# Patient Record
Sex: MALE | Race: WHITE | NOT HISPANIC OR LATINO | Employment: OTHER | URBAN - METROPOLITAN AREA
[De-identification: names, ages, dates, MRNs, and addresses within clinical notes are randomized per-mention and may not be internally consistent; named-entity substitution may affect disease eponyms.]

---

## 2017-01-04 DIAGNOSIS — N18.3 CKD (CHRONIC KIDNEY DISEASE), STAGE 3 (MODERATE): ICD-10-CM

## 2017-01-04 DIAGNOSIS — I10 ESSENTIAL HYPERTENSION: ICD-10-CM

## 2017-01-04 RX ORDER — ERGOCALCIFEROL 1.25 MG/1
50000 CAPSULE ORAL
Qty: 12 CAPSULE | Refills: 2 | Status: SHIPPED | OUTPATIENT
Start: 2017-01-04 | End: 2017-07-26 | Stop reason: SDUPTHER

## 2017-01-04 RX ORDER — LISINOPRIL 5 MG/1
5 TABLET ORAL DAILY
Qty: 30 TABLET | Refills: 4 | Status: SHIPPED | OUTPATIENT
Start: 2017-01-04 | End: 2017-06-05 | Stop reason: SDUPTHER

## 2017-01-11 ENCOUNTER — LAB VISIT (OUTPATIENT)
Dept: LAB | Facility: HOSPITAL | Age: 72
End: 2017-01-11
Attending: INTERNAL MEDICINE
Payer: MEDICARE

## 2017-01-11 DIAGNOSIS — N18.30 CKD (CHRONIC KIDNEY DISEASE), STAGE III: ICD-10-CM

## 2017-01-11 DIAGNOSIS — E21.3 HYPERPARATHYROIDISM: ICD-10-CM

## 2017-01-11 DIAGNOSIS — I10 ESSENTIAL HYPERTENSION: ICD-10-CM

## 2017-01-11 LAB
ALBUMIN SERPL BCP-MCNC: 3.6 G/DL
ANION GAP SERPL CALC-SCNC: 9 MMOL/L
BACTERIA #/AREA URNS HPF: NORMAL /HPF
BASOPHILS # BLD AUTO: 0.04 K/UL
BASOPHILS NFR BLD: 0.5 %
BILIRUB UR QL STRIP: NEGATIVE
BUN SERPL-MCNC: 23 MG/DL
CALCIUM SERPL-MCNC: 9.7 MG/DL
CHLORIDE SERPL-SCNC: 109 MMOL/L
CLARITY UR: CLEAR
CO2 SERPL-SCNC: 27 MMOL/L
COLOR UR: YELLOW
CREAT SERPL-MCNC: 1.8 MG/DL
CREAT UR-MCNC: 94.3 MG/DL
DIFFERENTIAL METHOD: ABNORMAL
EOSINOPHIL # BLD AUTO: 0.4 K/UL
EOSINOPHIL NFR BLD: 4.7 %
ERYTHROCYTE [DISTWIDTH] IN BLOOD BY AUTOMATED COUNT: 16 %
EST. GFR  (AFRICAN AMERICAN): 43 ML/MIN/1.73 M^2
EST. GFR  (NON AFRICAN AMERICAN): 37 ML/MIN/1.73 M^2
GLUCOSE SERPL-MCNC: 111 MG/DL
GLUCOSE UR QL STRIP: NEGATIVE
HCT VFR BLD AUTO: 47.4 %
HGB BLD-MCNC: 15.6 G/DL
HGB UR QL STRIP: ABNORMAL
HYALINE CASTS #/AREA URNS LPF: 0 /LPF
KETONES UR QL STRIP: NEGATIVE
LEUKOCYTE ESTERASE UR QL STRIP: NEGATIVE
LYMPHOCYTES # BLD AUTO: 1.7 K/UL
LYMPHOCYTES NFR BLD: 20.6 %
MCH RBC QN AUTO: 30.1 PG
MCHC RBC AUTO-ENTMCNC: 32.9 %
MCV RBC AUTO: 92 FL
MICROSCOPIC COMMENT: NORMAL
MONOCYTES # BLD AUTO: 0.8 K/UL
MONOCYTES NFR BLD: 10 %
NEUTROPHILS # BLD AUTO: 5.3 K/UL
NEUTROPHILS NFR BLD: 64.2 %
NITRITE UR QL STRIP: NEGATIVE
PH UR STRIP: 7 [PH] (ref 5–8)
PHOSPHATE SERPL-MCNC: 2.6 MG/DL
PLATELET # BLD AUTO: 197 K/UL
PMV BLD AUTO: 11.4 FL
POTASSIUM SERPL-SCNC: 4.3 MMOL/L
PROT UR QL STRIP: ABNORMAL
PROT UR-MCNC: 159 MG/DL
PROT/CREAT RATIO, UR: 1.69
RBC # BLD AUTO: 5.18 M/UL
RBC #/AREA URNS HPF: 3 /HPF (ref 0–4)
SODIUM SERPL-SCNC: 145 MMOL/L
SP GR UR STRIP: 1.02 (ref 1–1.03)
URN SPEC COLLECT METH UR: ABNORMAL
UROBILINOGEN UR STRIP-ACNC: NEGATIVE EU/DL
WBC # BLD AUTO: 8.31 K/UL
WBC #/AREA URNS HPF: 3 /HPF (ref 0–5)

## 2017-01-11 PROCEDURE — 83970 ASSAY OF PARATHORMONE: CPT

## 2017-01-11 PROCEDURE — 85025 COMPLETE CBC W/AUTO DIFF WBC: CPT

## 2017-01-11 PROCEDURE — 80069 RENAL FUNCTION PANEL: CPT

## 2017-01-11 PROCEDURE — 36415 COLL VENOUS BLD VENIPUNCTURE: CPT

## 2017-01-11 PROCEDURE — 82570 ASSAY OF URINE CREATININE: CPT

## 2017-01-11 PROCEDURE — 81000 URINALYSIS NONAUTO W/SCOPE: CPT

## 2017-01-12 LAB — PTH-INTACT SERPL-MCNC: 73 PG/ML

## 2017-01-18 ENCOUNTER — OFFICE VISIT (OUTPATIENT)
Dept: NEPHROLOGY | Facility: CLINIC | Age: 72
End: 2017-01-18
Payer: MEDICARE

## 2017-01-18 VITALS
RESPIRATION RATE: 16 BRPM | HEIGHT: 72 IN | SYSTOLIC BLOOD PRESSURE: 112 MMHG | BODY MASS INDEX: 28.64 KG/M2 | DIASTOLIC BLOOD PRESSURE: 82 MMHG | WEIGHT: 211.44 LBS | HEART RATE: 80 BPM

## 2017-01-18 DIAGNOSIS — N18.30 CKD (CHRONIC KIDNEY DISEASE), STAGE III: ICD-10-CM

## 2017-01-18 DIAGNOSIS — N40.1 BENIGN NON-NODULAR PROSTATIC HYPERPLASIA WITH LOWER URINARY TRACT SYMPTOMS: ICD-10-CM

## 2017-01-18 DIAGNOSIS — I63.9 CEREBROVASCULAR ACCIDENT (CVA), UNSPECIFIED MECHANISM: ICD-10-CM

## 2017-01-18 DIAGNOSIS — I10 ESSENTIAL HYPERTENSION: ICD-10-CM

## 2017-01-18 DIAGNOSIS — C43.9 MALIGNANT MELANOMA, UNSPECIFIED SITE: ICD-10-CM

## 2017-01-18 DIAGNOSIS — Z90.5 ABSENT KIDNEY, ACQUIRED: Chronic | ICD-10-CM

## 2017-01-18 DIAGNOSIS — E21.3 HYPERPARATHYROIDISM: Primary | ICD-10-CM

## 2017-01-18 PROCEDURE — 3074F SYST BP LT 130 MM HG: CPT | Mod: S$GLB,,, | Performed by: INTERNAL MEDICINE

## 2017-01-18 PROCEDURE — 1160F RVW MEDS BY RX/DR IN RCRD: CPT | Mod: S$GLB,,, | Performed by: INTERNAL MEDICINE

## 2017-01-18 PROCEDURE — 1157F ADVNC CARE PLAN IN RCRD: CPT | Mod: S$GLB,,, | Performed by: INTERNAL MEDICINE

## 2017-01-18 PROCEDURE — 1159F MED LIST DOCD IN RCRD: CPT | Mod: S$GLB,,, | Performed by: INTERNAL MEDICINE

## 2017-01-18 PROCEDURE — 1126F AMNT PAIN NOTED NONE PRSNT: CPT | Mod: S$GLB,,, | Performed by: INTERNAL MEDICINE

## 2017-01-18 PROCEDURE — 3079F DIAST BP 80-89 MM HG: CPT | Mod: S$GLB,,, | Performed by: INTERNAL MEDICINE

## 2017-01-18 PROCEDURE — 99999 PR PBB SHADOW E&M-EST. PATIENT-LVL III: CPT | Mod: PBBFAC,,, | Performed by: INTERNAL MEDICINE

## 2017-01-18 PROCEDURE — 99213 OFFICE O/P EST LOW 20 MIN: CPT | Mod: S$GLB,,, | Performed by: INTERNAL MEDICINE

## 2017-01-18 NOTE — MR AVS SNAPSHOT
[facility]  [address] [facility] [address]  Phone: [phone]                  Federico Knutson   2017 2:00 PM   Office Visit    Description:  Male : 1945   Provider:  Nereida Lei MD   Department:  [facility]           Diagnoses this Visit        Comments    Hyperparathyroidism    -  Primary     Malignant melanoma, unspecified site         CKD (chronic kidney disease), stage III         Absent kidney, acquired         Benign non-nodular prostatic hyperplasia with lower urinary tract symptoms         Cerebrovascular accident (CVA), unspecified mechanism         Essential hypertension                To Do List           Goals (5 Years of Data)     None      Follow-Up and Disposition     Return in about 4 months (around 2017).    Follow-up and Disposition History      [facility]     [facility] On Call Nurse Care Line -  Assistance  Registered nurses in the [facility] On Call Center provide clinical advisement, health education, appointment booking, and other advisory services.  Call for this free service at [phone].             Medications           Message regarding Medications     Verify the changes and/or additions to your medication regime listed below are the same as discussed with your clinician today.  If any of these changes or additions are incorrect, please notify your healthcare provider.        STOP taking these medications     pantoprazole (PROTONIX) 40 MG tablet Take 40 mg by mouth once daily.            Verify that the below list of medications is an accurate representation of the medications you are currently taking.  If none reported, the list may be blank. If incorrect, please contact your healthcare provider. Carry this list with you in case of emergency.           Current Medications     aspirin 325 MG tablet Take 81 mg by mouth once daily.     ergocalciferol (ERGOCALCIFEROL) 50,000 unit Cap Take 1 capsule  (50,000 Units total) by mouth every 14 (fourteen) days.    lisinopril (PRINIVIL,ZESTRIL) 5 MG tablet Take 1 tablet (5 mg total) by mouth once daily.    rosuvastatin (CRESTOR) 40 MG Tab Take 40 mg by mouth every evening.    tamsulosin (FLOMAX) 0.4 mg Cp24 Take 0.4 mg by mouth once daily.           Clinical Reference Information           Vital Signs - Last Recorded  Most recent update: 1/18/2017  2:03 PM by Makayla Sahu MA    BP Pulse Resp Ht Wt BMI    112/82 (BP Location: Left arm, Patient Position: Sitting, BP Method: Manual) 80 16 6' (1.829 m) 95.9 kg (211 lb 6.7 oz) 28.67 kg/m2      Blood Pressure          Most Recent Value    BP  112/82      Allergies as of 1/18/2017     No Known Allergies      Immunizations Administered on Date of Encounter - 1/18/2017     None      Orders Placed During Today's Visit     Future Labs/Procedures Expected by Expires    US Retroperitoneal Complete  1/18/2017 1/18/2018    CBC auto differential  4/18/2017 1/18/2018    Protein / creatinine ratio, urine  5/18/2017 1/18/2018    PTH, intact  5/18/2017 1/18/2018    Renal function panel  5/18/2017 1/18/2018    Urinalysis  5/18/2017 1/18/2018    Vitamin D  5/18/2017 1/18/2018      Instructions      1. Labs: in 3 months and renal Us prior to next visit  2.  Medications:      5. BP:  Take BP and pulse  twice daily for one week, record              Bring results  to next visit.              Goal :   <140/90    6. Diet:         Sodium: < 2000 milligrams daily including all food and drink      (one teaspoon of table salt has 2300 milligrams of sodium)       Please stop smoking and speak to your PCP about cessation therapy and hip pain.      7. Please avoid or minimize all NSAIDS (ibuprofen, motrin, aleve, indocin, naprosyn) to minimize the risk to your kidneys    8. Return to clinic: 4 months       Smoking Cessation     If you would like to quit smoking:   You may be eligible for free services if you are a [place] resident and started  smoking cigarettes before September 1, 1988.  Call the [employer] ([employer]) toll free at (199) 250-4062 or (970) 143-8033.   Call 3-302-QUIT-NOW if you do not meet the above criteria.

## 2017-01-18 NOTE — PATIENT INSTRUCTIONS
1. Labs: in 3 months and renal Us prior to next visit  2.  Medications:      5. BP:  Take BP and pulse  twice daily for one week, record              Bring results  to next visit.              Goal :   <140/90    6. Diet:         Sodium: < 2000 milligrams daily including all food and drink      (one teaspoon of table salt has 2300 milligrams of sodium)       Please stop smoking and speak to your PCP about cessation therapy and hip pain.      7. Please avoid or minimize all NSAIDS (ibuprofen, motrin, aleve, indocin, naprosyn) to minimize the risk to your kidneys    8. Return to clinic: 4 months

## 2017-01-18 NOTE — PROGRESS NOTES
Subjective:       Patient ID: Federico Knutson is a 71 y.o. White male who presents for new a follow up of CKD  Interval hx:  Patient feels well, no LUTS, hematuria,, dysuria, SOB, CP, peripheral edema, He is not using protonix, he has used some motrin fro hip pain but usually uses tylenol. He is still smoking.serum crt stable at 1.8 mg/dl, electrolytes stable.  He does not take his bp at home.      71 yo Gentleman with a hx of Melanoma of the forehead excised (lymph nodes) CVA, The patient has a history of obstructive uropathy and is seen by Dr. Ray. He underwent dye laser prostatectomy 6/9/15. Postvoid residual urine prior to prostatectomy was 2000 cc. Postvoid residual urine in June ago was 100 cc. Patient has compromised renal function at present. 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 Renal ultrasound was ago showed mild right hydronephrosis and subsequent retrograde pyelogram revealed no ureteral obstruction. The patient has no family history of kidney disease. The patient does not freuqently use NSAIDS or herbal supplements. He had a GI bleed last week and was found to have an ulcer. He also still has episodes of macroscopic hematuria after his prostatectomy. Also recently treated for a urinary tract infection. He still smokes a pack a day. He drinks a gallon of milk a day. Not on a low salt diet.   He has stable kidney fuction for the last year but still ongoing proteinuria.He complains about left hip pain and back pain which has been getting worse.       HPI  Review of Systems   Constitutional: Negative.    HENT: Negative.    Eyes: Negative.    Respiratory: Positive for cough.    Cardiovascular: Negative.    Gastrointestinal: Negative.  Negative for diarrhea, nausea and vomiting.   Genitourinary: Negative for decreased urine volume, difficulty urinating, dysuria, hematuria and urgency.   Musculoskeletal: Negative.    Skin: Negative.     Neurological: Negative.    Psychiatric/Behavioral: Negative.        Objective:     Blood pressure 112/82, pulse 80, resp. rate 16, height 6' (1.829 m), weight 95.9 kg (211 lb 6.7 oz).      Physical Exam   Constitutional: He is oriented to person, place, and time. He appears well-developed and well-nourished.   HENT:   Head: Normocephalic and atraumatic.   Right Ear: External ear normal.   Left Ear: External ear normal.   Nose: Nose normal.   Mouth/Throat: Oropharynx is clear and moist.   Eyes: Conjunctivae and EOM are normal. Pupils are equal, round, and reactive to light.   Neck: Normal range of motion. Neck supple. No JVD present.   Cardiovascular: Normal rate, regular rhythm, normal heart sounds and intact distal pulses.    No murmur heard.  Pulmonary/Chest: Effort normal and breath sounds normal. No stridor. No respiratory distress. He has no rales. He exhibits no tenderness.   Abdominal: Soft. Bowel sounds are normal. He exhibits no distension and no mass. There is no tenderness. There is no rebound and no guarding.   Musculoskeletal: Normal range of motion.   Lymphadenopathy:     He has no cervical adenopathy.   Neurological: He is alert and oriented to person, place, and time. He has normal reflexes. No cranial nerve deficit. Coordination normal.   Skin: Skin is warm and dry.   2 scars on right forehead, 2 cm lesion on his back.   Psychiatric: He has a normal mood and affect. His behavior is normal. Judgment and thought content normal.   Nursing note and vitals reviewed.      Assessment:       1. Hyperparathyroidism    2. Malignant melanoma, unspecified site    3. CKD (chronic kidney disease), stage III    4. Absent kidney, acquired    5. Benign non-nodular prostatic hyperplasia with lower urinary tract symptoms    6. Cerebrovascular accident (CVA), unspecified mechanism    7. Essential hypertension        Plan:       1. CKD3: stable renal function for the last 6 month - long standing low proteinuria:  multifactorial: solitary kidney (secodary to stone disease), multiple cysts in his remaining right kidney, post obstruction, likely has vascular disease.   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 Also the smoking could cause nodular sclerosis.  - already on a statin  - No discrete monoclonal peaks identified.  He is not compliant with any kind of diet. I told him that his kidney function will decline slowly.  - at this time because of a single kidney and the patient does not want a biopsy    - started low dose ACE (would like to increase it but the patient was mildly hyperkalemic).     -acquired cysts repeat US  Elevated PSA and followed by Dr. Ray Urology.     Lab Results   Component Value Date    CREATININE 1.8 (H) 01/11/2017     ·   Acid-Base:   Lab Results   Component Value Date     01/11/2017    K 4.3 01/11/2017    CO2 27 01/11/2017     2. HTN: Blood pressures well controlled    3. Renal osteodystrophy: will re check PTH   Lab Results   Component Value Date    PTH 73.0 01/11/2017    CALCIUM 9.7 01/11/2017    PHOS 2.6 (L) 01/11/2017       4. Anemia: not anemic, H/H relatively elevated considering renal disease - likely from smoking.  Lab Results   Component Value Date    HGB 15.6 01/11/2017        5. DM:  Last HbA1C   Lab Results   Component Value Date    HGBA1C 6.1 04/03/2013       6. Lipid management: on a statin.  Lab Results   Component Value Date    LDLCALC 56.0 (L) 04/03/2013     7. Elevated PSA:followed by Dr. Ray    8. The patient is following up with Dermatology and was explained that it is very important.     9. He was educated again to quit smoking!!    Follow up in 4 month with labs

## 2017-05-04 ENCOUNTER — HOSPITAL ENCOUNTER (OUTPATIENT)
Dept: RADIOLOGY | Facility: HOSPITAL | Age: 72
Discharge: HOME | End: 2017-05-04
Attending: INTERNAL MEDICINE
Payer: MEDICARE

## 2017-05-04 DIAGNOSIS — Z90.5 ABSENT KIDNEY, ACQUIRED: Chronic | ICD-10-CM

## 2017-05-04 DIAGNOSIS — N40.1 BENIGN NON-NODULAR PROSTATIC HYPERPLASIA WITH LOWER URINARY TRACT SYMPTOMS: ICD-10-CM

## 2017-05-04 DIAGNOSIS — I63.9 CEREBROVASCULAR ACCIDENT (CVA), UNSPECIFIED MECHANISM: ICD-10-CM

## 2017-05-04 DIAGNOSIS — E21.3 HYPERPARATHYROIDISM: ICD-10-CM

## 2017-05-04 DIAGNOSIS — N18.30 CKD (CHRONIC KIDNEY DISEASE), STAGE III: ICD-10-CM

## 2017-05-04 DIAGNOSIS — I10 ESSENTIAL HYPERTENSION: ICD-10-CM

## 2017-05-04 DIAGNOSIS — C43.9 MALIGNANT MELANOMA, UNSPECIFIED SITE: ICD-10-CM

## 2017-05-04 PROCEDURE — 76770 US EXAM ABDO BACK WALL COMP: CPT | Mod: 26,,, | Performed by: RADIOLOGY

## 2017-05-04 PROCEDURE — 76770 US EXAM ABDO BACK WALL COMP: CPT | Mod: TC

## 2017-05-17 ENCOUNTER — TELEPHONE (OUTPATIENT)
Dept: NEPHROLOGY | Facility: CLINIC | Age: 72
End: 2017-05-17

## 2017-05-17 NOTE — TELEPHONE ENCOUNTER
Renal US shows that kidtracee has the same cysts and has aged, as expected, but overall stable

## 2017-06-05 DIAGNOSIS — I10 ESSENTIAL HYPERTENSION: ICD-10-CM

## 2017-06-05 RX ORDER — LISINOPRIL 5 MG/1
5 TABLET ORAL DAILY
Qty: 30 TABLET | Refills: 4 | Status: SHIPPED | OUTPATIENT
Start: 2017-06-05 | End: 2017-11-10 | Stop reason: SDUPTHER

## 2017-07-26 ENCOUNTER — OFFICE VISIT (OUTPATIENT)
Dept: NEPHROLOGY | Facility: CLINIC | Age: 72
End: 2017-07-26
Payer: MEDICARE

## 2017-07-26 VITALS
HEIGHT: 72 IN | HEART RATE: 80 BPM | BODY MASS INDEX: 28.54 KG/M2 | DIASTOLIC BLOOD PRESSURE: 84 MMHG | WEIGHT: 210.75 LBS | RESPIRATION RATE: 18 BRPM | SYSTOLIC BLOOD PRESSURE: 118 MMHG

## 2017-07-26 DIAGNOSIS — N18.30 CKD (CHRONIC KIDNEY DISEASE), STAGE III: Primary | ICD-10-CM

## 2017-07-26 DIAGNOSIS — I10 ESSENTIAL HYPERTENSION: ICD-10-CM

## 2017-07-26 DIAGNOSIS — R80.0 ISOLATED PROTEINURIA WITHOUT SPECIFIC MORPHOLOGIC LESION: ICD-10-CM

## 2017-07-26 PROBLEM — R80.9 PROTEINURIA: Status: ACTIVE | Noted: 2017-07-26

## 2017-07-26 PROCEDURE — 1125F AMNT PAIN NOTED PAIN PRSNT: CPT | Mod: S$GLB,,, | Performed by: INTERNAL MEDICINE

## 2017-07-26 PROCEDURE — 1159F MED LIST DOCD IN RCRD: CPT | Mod: S$GLB,,, | Performed by: INTERNAL MEDICINE

## 2017-07-26 PROCEDURE — 99999 PR PBB SHADOW E&M-EST. PATIENT-LVL III: CPT | Mod: PBBFAC,,, | Performed by: INTERNAL MEDICINE

## 2017-07-26 PROCEDURE — 99213 OFFICE O/P EST LOW 20 MIN: CPT | Mod: S$GLB,,, | Performed by: INTERNAL MEDICINE

## 2017-07-26 RX ORDER — ASPIRIN 81 MG/1
81 TABLET ORAL DAILY
COMMUNITY

## 2017-07-26 RX ORDER — ACETAMINOPHEN 500 MG
1000 TABLET ORAL DAILY PRN
COMMUNITY

## 2017-07-26 RX ORDER — FLUOROURACIL 50 MG/G
CREAM TOPICAL 2 TIMES DAILY
COMMUNITY
End: 2018-01-24

## 2017-07-26 RX ORDER — ERGOCALCIFEROL 1.25 MG/1
50000 CAPSULE ORAL
Qty: 12 CAPSULE | Refills: 2 | Status: SHIPPED | OUTPATIENT
Start: 2017-07-26

## 2017-07-26 NOTE — Clinical Note
I also ordered a 24-hour urine protein electrophoresis and urine for electrolytes electrophoresis.  He can jug up when he goes for his labs in about a month.

## 2017-07-26 NOTE — PATIENT INSTRUCTIONS
Labs renal function panel, cbc and UPRCT every 3 months    24 hr upep/saturnino    Make appointment with urologist to follow PSA      Stop smoking     [place] [address] u once a month

## 2017-07-26 NOTE — Clinical Note
I put the patient in for standing labs every 3 months he should start in about another month as his last labs were in a period

## 2017-07-26 NOTE — PROGRESS NOTES
Subjective:       Patient ID: Federico Knutson is a 72 y.o. White male who presents for new a follow up of CKD  Interval hx:  Patient feels well, no LUTS, hematuria,, dysuria, SOB, CP, peripheral edema, He is not using protonix, he has used some motrin fro hip pain but usually uses tylenol. He is still smoking.serum crt stable at 1.8 mg/dl, electrolytes stable.  He does not take his bp at home.      71 yo Gentleman with a hx of Melanoma of the forehead excised (lymph nodes) CVA, The patient has a history of obstructive uropathy and is seen by Dr. Ray. He underwent dye laser prostatectomy 6/9/15. Postvoid residual urine prior to prostatectomy was 2000 cc. Postvoid residual urine in June ago was 100 cc. Patient has compromised renal function at present. 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 Renal ultrasound was ago showed mild right hydronephrosis and subsequent retrograde pyelogram revealed no ureteral obstruction. The patient has no family history of kidney disease. The patient does not freuqently use NSAIDS or herbal supplements. He had a GI bleed last week and was found to have an ulcer. He also still has episodes of macroscopic hematuria after his prostatectomy. Also recently treated for a urinary tract infection. He still smokes a pack a day. He drinks a gallon of milk a day. Not on a low salt diet.   He has stable kidney fuction for the last year but still ongoing proteinuria.He complains about left hip pain and back pain which has been getting worse.   2013 ef 55    UPRCT now 2.2 increased from 1.0 a year ago serum crt 1.9 stable is on 5 mg of lisinopril. borderline potassiums of 5-5.1 in the past.  Hemoglobin is now 17.  He is still smoking 1 pack per day and refuses to stop.  He has not seen a urologist in some time.  He will make an appointment with a new local urologist in the next couple of months  The patient has no symptoms of  fatigue, shortness of breath, chest pain, peripheral edema, flank pain, dysuria, hematuria, foamy urine, orange colored urine, nephrolithiasis,  diarrhea, constipation, lower extremity leg cramping, headache, nausea and vomiting, or weakness.      HPI  Review of Systems   Constitutional: Negative.    HENT: Negative.    Eyes: Negative.    Respiratory: Positive for cough.    Cardiovascular: Negative.    Gastrointestinal: Negative.  Negative for diarrhea, nausea and vomiting.   Genitourinary: Negative for decreased urine volume, difficulty urinating, dysuria, hematuria and urgency.   Musculoskeletal: Negative.    Skin: Negative.    Neurological: Negative.    Psychiatric/Behavioral: Negative.        Objective:     Blood pressure 118/84, pulse 80, resp. rate 18, height 6' (1.829 m), weight 95.6 kg (210 lb 12.2 oz).      Physical Exam   Constitutional: He is oriented to person, place, and time. He appears well-developed and well-nourished.   HENT:   Head: Normocephalic and atraumatic.   Right Ear: External ear normal.   Left Ear: External ear normal.   Nose: Nose normal.   Mouth/Throat: Oropharynx is clear and moist.   Eyes: Conjunctivae and EOM are normal. Pupils are equal, round, and reactive to light.   Neck: Normal range of motion. Neck supple. No JVD present.   Cardiovascular: Normal rate, regular rhythm, normal heart sounds and intact distal pulses.    No murmur heard.  Trace bilateral ankle edema   Pulmonary/Chest: No stridor. No respiratory distress. He has wheezes. He exhibits no tenderness.   Diminished breath sounds throughout decreased effort with scattered rhonchi and wheezes throughout   Abdominal: Soft. Bowel sounds are normal. He exhibits no distension and no mass. There is no tenderness. There is no rebound and no guarding.   Musculoskeletal: Normal range of motion.   Lymphadenopathy:     He has no cervical adenopathy.   Neurological: He is alert and oriented to person, place, and time. He has normal  reflexes. No cranial nerve deficit. Coordination normal.   Skin: Skin is warm and dry.   2 scars on right forehead, 2 cm lesion on his back.   Psychiatric: He has a normal mood and affect. His behavior is normal. Judgment and thought content normal.   Nursing note and vitals reviewed.      Assessment:       1. CKD (chronic kidney disease), stage III    2. Essential hypertension        Plan:       1. CKD3: stable renal function  - long standing low proteinuria: multifactorial: solitary kidney (secodary to stone disease), multiple cysts in his remaining right kidney, post obstruction, likely has vascular disease.   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 Also the smoking could cause nodular sclerosis.  - already on a statin  - No discrete monoclonal peaks identified.  Proteinuria has increased on U [facility] CT from 1-2.  We'll obtain 24-hour urine for UPEP and I FE.    He is not compliant with any kind of diet. I told him that his kidney function will decline slowly.  - at this time because of a single kidney and the patient does not want a biopsy    - started low dose ACE (would like to increase it but the patient was mildly hyperkalemic).  Continue present dose     -acquired cysts repeat US annually.    Elevated PSA   To set up an appointment with a new urologist     Lab Results   Component Value Date    CREATININE 1.9 (H) 05/04/2017     ·   Acid-Base:   Lab Results   Component Value Date     05/04/2017    K 4.6 05/04/2017    CO2 25 05/04/2017     2. HTN: Blood pressures well controlled    3. Renal osteodystrophy: PTH and vitamin D stable reduce her ergocalciferol to 50,000 units once monthly  Lab Results   Component Value Date    PTH 77.0 05/04/2017    CALCIUM 9.6 05/04/2017    PHOS 3.2 05/04/2017       4. Anemia: not anemic, H/H relatively elevated considering renal disease - likely from smoking.  Lab Results   Component Value Date    HGB 17.0 05/04/2017         5. DM:  Last HbA1C   Lab Results   Component Value Date    HGBA1C 6.1 04/03/2013       6. Lipid management: on a statin.  Lab Results   Component Value Date    LDLCALC 56.0 (L) 04/03/2013     7. Elevated PSA:    8. The patient is following up with Dermatology and was explained that it is very important.     9. He was educated again to quit smoking!!    Follow up in 3-4 month with labs every 3 months.

## 2017-08-17 ENCOUNTER — LAB VISIT (OUTPATIENT)
Dept: LAB | Facility: HOSPITAL | Age: 72
End: 2017-08-17
Attending: INTERNAL MEDICINE
Payer: MEDICARE

## 2017-08-17 DIAGNOSIS — N18.30 CKD (CHRONIC KIDNEY DISEASE), STAGE III: ICD-10-CM

## 2017-08-17 DIAGNOSIS — I10 ESSENTIAL HYPERTENSION: ICD-10-CM

## 2017-08-17 LAB
ALBUMIN SERPL BCP-MCNC: 3.6 G/DL
ANION GAP SERPL CALC-SCNC: 9 MMOL/L
BASOPHILS # BLD AUTO: 0.05 K/UL
BASOPHILS NFR BLD: 0.5 %
BUN SERPL-MCNC: 25 MG/DL
CALCIUM SERPL-MCNC: 10 MG/DL
CHLORIDE SERPL-SCNC: 109 MMOL/L
CO2 SERPL-SCNC: 26 MMOL/L
CREAT SERPL-MCNC: 2.1 MG/DL
DIFFERENTIAL METHOD: ABNORMAL
EOSINOPHIL # BLD AUTO: 0.4 K/UL
EOSINOPHIL NFR BLD: 4.1 %
ERYTHROCYTE [DISTWIDTH] IN BLOOD BY AUTOMATED COUNT: 15.7 %
EST. GFR  (AFRICAN AMERICAN): 35 ML/MIN/1.73 M^2
EST. GFR  (NON AFRICAN AMERICAN): 30 ML/MIN/1.73 M^2
GLUCOSE SERPL-MCNC: 92 MG/DL
HCT VFR BLD AUTO: 48.8 %
HGB BLD-MCNC: 15.4 G/DL
LYMPHOCYTES # BLD AUTO: 2.1 K/UL
LYMPHOCYTES NFR BLD: 20.4 %
MCH RBC QN AUTO: 29.2 PG
MCHC RBC AUTO-ENTMCNC: 31.6 G/DL
MCV RBC AUTO: 93 FL
MONOCYTES # BLD AUTO: 1 K/UL
MONOCYTES NFR BLD: 9.2 %
NEUTROPHILS # BLD AUTO: 6.8 K/UL
NEUTROPHILS NFR BLD: 65.8 %
PHOSPHATE SERPL-MCNC: 3.3 MG/DL
PLATELET # BLD AUTO: 201 K/UL
PMV BLD AUTO: 10.7 FL
POTASSIUM SERPL-SCNC: 5.1 MMOL/L
RBC # BLD AUTO: 5.27 M/UL
SODIUM SERPL-SCNC: 144 MMOL/L
WBC # BLD AUTO: 10.37 K/UL

## 2017-08-17 PROCEDURE — 36415 COLL VENOUS BLD VENIPUNCTURE: CPT

## 2017-08-17 PROCEDURE — 85025 COMPLETE CBC W/AUTO DIFF WBC: CPT

## 2017-08-17 PROCEDURE — 80069 RENAL FUNCTION PANEL: CPT

## 2017-08-19 ENCOUNTER — LAB VISIT (OUTPATIENT)
Dept: LAB | Facility: HOSPITAL | Age: 72
End: 2017-08-19
Attending: INTERNAL MEDICINE
Payer: MEDICARE

## 2017-08-19 DIAGNOSIS — I10 ESSENTIAL HYPERTENSION: ICD-10-CM

## 2017-08-19 DIAGNOSIS — N18.30 CKD (CHRONIC KIDNEY DISEASE), STAGE III: ICD-10-CM

## 2017-08-19 LAB
CREAT UR-MCNC: 93.1 MG/DL
PROT 24H UR-MRATE: 1971 MG/SPEC
PROT UR-MCNC: 112 MG/DL
PROT UR-MCNC: 113 MG/DL
PROT/CREAT RATIO, UR: 1.21
URINE COLLECTION DURATION: 24 HR
URINE VOLUME: 1760 ML

## 2017-08-19 PROCEDURE — 84156 ASSAY OF PROTEIN URINE: CPT | Mod: 91

## 2017-08-19 PROCEDURE — 84166 PROTEIN E-PHORESIS/URINE/CSF: CPT | Mod: 26,,, | Performed by: PATHOLOGY

## 2017-08-19 PROCEDURE — 86335 IMMUNFIX E-PHORSIS/URINE/CSF: CPT | Mod: 26,,, | Performed by: PATHOLOGY

## 2017-08-19 PROCEDURE — 84166 PROTEIN E-PHORESIS/URINE/CSF: CPT

## 2017-08-19 PROCEDURE — 86335 IMMUNFIX E-PHORSIS/URINE/CSF: CPT

## 2017-08-19 PROCEDURE — 84156 ASSAY OF PROTEIN URINE: CPT

## 2017-08-21 LAB
PATHOLOGIST INTERPRETATION UPE: NORMAL
PROT PATTERN UR ELPH-IMP: NORMAL

## 2017-08-24 LAB
INTERPRETATION UR IFE-IMP: NORMAL
PATHOLOGIST INTERPRETATION UIFE: NORMAL

## 2017-08-25 DIAGNOSIS — N18.30 CKD (CHRONIC KIDNEY DISEASE), STAGE III: ICD-10-CM

## 2017-08-25 DIAGNOSIS — R80.0 ISOLATED PROTEINURIA WITHOUT SPECIFIC MORPHOLOGIC LESION: Primary | ICD-10-CM

## 2017-09-05 ENCOUNTER — LAB VISIT (OUTPATIENT)
Dept: LAB | Facility: HOSPITAL | Age: 72
End: 2017-09-05
Attending: INTERNAL MEDICINE
Payer: MEDICARE

## 2017-09-05 DIAGNOSIS — R80.0 ISOLATED PROTEINURIA WITHOUT SPECIFIC MORPHOLOGIC LESION: ICD-10-CM

## 2017-09-05 DIAGNOSIS — N18.30 CKD (CHRONIC KIDNEY DISEASE), STAGE III: ICD-10-CM

## 2017-09-05 PROCEDURE — 84165 PROTEIN E-PHORESIS SERUM: CPT | Mod: 26,,, | Performed by: PATHOLOGY

## 2017-09-05 PROCEDURE — 86255 FLUORESCENT ANTIBODY SCREEN: CPT | Mod: 91

## 2017-09-05 PROCEDURE — 86038 ANTINUCLEAR ANTIBODIES: CPT

## 2017-09-05 PROCEDURE — 86334 IMMUNOFIX E-PHORESIS SERUM: CPT

## 2017-09-05 PROCEDURE — 86160 COMPLEMENT ANTIGEN: CPT

## 2017-09-05 PROCEDURE — 86160 COMPLEMENT ANTIGEN: CPT | Mod: 59

## 2017-09-05 PROCEDURE — 36415 COLL VENOUS BLD VENIPUNCTURE: CPT

## 2017-09-05 PROCEDURE — 84165 PROTEIN E-PHORESIS SERUM: CPT

## 2017-09-05 PROCEDURE — 86334 IMMUNOFIX E-PHORESIS SERUM: CPT | Mod: 26,,, | Performed by: PATHOLOGY

## 2017-09-06 DIAGNOSIS — R80.0 ISOLATED PROTEINURIA WITHOUT SPECIFIC MORPHOLOGIC LESION: Primary | ICD-10-CM

## 2017-09-06 DIAGNOSIS — N18.30 CKD (CHRONIC KIDNEY DISEASE), STAGE III: ICD-10-CM

## 2017-09-06 LAB
ALBUMIN SERPL ELPH-MCNC: 3.82 G/DL
ALPHA1 GLOB SERPL ELPH-MCNC: 0.34 G/DL
ALPHA2 GLOB SERPL ELPH-MCNC: 1.04 G/DL
ANA SER QL IF: NORMAL
B-GLOBULIN SERPL ELPH-MCNC: 0.85 G/DL
C3 SERPL-MCNC: 139 MG/DL
C4 SERPL-MCNC: 29 MG/DL
GAMMA GLOB SERPL ELPH-MCNC: 0.95 G/DL
INTERPRETATION SERPL IFE-IMP: NORMAL
PATHOLOGIST INTERPRETATION SPE: NORMAL
PROT SERPL-MCNC: 7 G/DL

## 2017-09-07 LAB — PATHOLOGIST INTERPRETATION IFE: NORMAL

## 2017-09-08 DIAGNOSIS — N18.30 CKD (CHRONIC KIDNEY DISEASE), STAGE III: Primary | ICD-10-CM

## 2017-09-08 DIAGNOSIS — R80.0 ISOLATED PROTEINURIA WITHOUT SPECIFIC MORPHOLOGIC LESION: ICD-10-CM

## 2017-09-08 LAB
ANCA AB TITR SER IF: NORMAL TITER
P-ANCA TITR SER IF: NORMAL TITER

## 2017-09-11 ENCOUNTER — TELEPHONE (OUTPATIENT)
Dept: NEPHROLOGY | Facility: CLINIC | Age: 72
End: 2017-09-11

## 2017-09-11 NOTE — TELEPHONE ENCOUNTER
Reached out to hem/onc phone staffed stated they will send it to the nurse navigator and they will reach out to the pt.

## 2017-09-12 ENCOUNTER — TELEPHONE (OUTPATIENT)
Dept: HEMATOLOGY/ONCOLOGY | Facility: CLINIC | Age: 72
End: 2017-09-12

## 2017-09-12 NOTE — TELEPHONE ENCOUNTER
Left message for patient to callback for scheduling of consult with hematology.      No callback received.  Mailed patient a reminder to call.  Noted re-test 10/27.    ----- Message from Hari Tee sent at 9/11/2017 11:47 AM CDT -----  Contact: Neph  Hematology pt     R80.0 (ICD-10-CM) - Isolated proteinuria without specific morphologic lesion  N18.3 (ICD-10-CM) - CKD (chronic kidney disease), stage III    YURI CHRISTENSEN    Pt contact::[phone]    Ext::[phone]

## 2017-09-12 NOTE — LETTER
September 13, 2017    Federico Knutson  [address]  [place] [address]             [facility]  [address]  [place] [address]  Phone: [phone] Dear Mr. Knutson:    Dr. Lei recommended an evaluation by a hematologist for you.  We were unsuccessful in reaching you by phone.    Please give us a call at [phone] or [phone] to schedule an appointment.    If you have any questions or concerns, please don't hesitate to call.    Sincerely,        Cortney Craig, RN   Nurse Navigator

## 2017-11-10 DIAGNOSIS — I10 ESSENTIAL HYPERTENSION: Primary | ICD-10-CM

## 2017-11-10 RX ORDER — LISINOPRIL 5 MG/1
5 TABLET ORAL DAILY
Qty: 30 TABLET | Refills: 4 | Status: SHIPPED | OUTPATIENT
Start: 2017-11-10 | End: 2017-11-14 | Stop reason: SDUPTHER

## 2017-11-14 DIAGNOSIS — I10 ESSENTIAL HYPERTENSION: Primary | ICD-10-CM

## 2017-11-14 RX ORDER — LISINOPRIL 5 MG/1
5 TABLET ORAL DAILY
Qty: 30 TABLET | Refills: 4 | Status: SHIPPED | OUTPATIENT
Start: 2017-11-14

## 2017-12-06 ENCOUNTER — TELEPHONE (OUTPATIENT)
Dept: NEPHROLOGY | Facility: CLINIC | Age: 72
End: 2017-12-06

## 2018-01-19 ENCOUNTER — LAB VISIT (OUTPATIENT)
Dept: LAB | Facility: HOSPITAL | Age: 73
End: 2018-01-19
Attending: INTERNAL MEDICINE
Payer: MEDICARE

## 2018-01-19 DIAGNOSIS — I10 ESSENTIAL HYPERTENSION: ICD-10-CM

## 2018-01-19 DIAGNOSIS — R80.0 ISOLATED PROTEINURIA WITHOUT SPECIFIC MORPHOLOGIC LESION: ICD-10-CM

## 2018-01-19 DIAGNOSIS — N18.30 CKD (CHRONIC KIDNEY DISEASE), STAGE III: ICD-10-CM

## 2018-01-19 LAB
ALBUMIN SERPL BCP-MCNC: 3.8 G/DL
ANION GAP SERPL CALC-SCNC: 8 MMOL/L
BASOPHILS # BLD AUTO: 0.03 K/UL
BASOPHILS NFR BLD: 0.3 %
BUN SERPL-MCNC: 29 MG/DL
CALCIUM SERPL-MCNC: 10.1 MG/DL
CHLORIDE SERPL-SCNC: 107 MMOL/L
CO2 SERPL-SCNC: 27 MMOL/L
CREAT SERPL-MCNC: 2 MG/DL
CREAT UR-MCNC: 136.7 MG/DL
DIFFERENTIAL METHOD: ABNORMAL
EOSINOPHIL # BLD AUTO: 0.4 K/UL
EOSINOPHIL NFR BLD: 4 %
ERYTHROCYTE [DISTWIDTH] IN BLOOD BY AUTOMATED COUNT: 15.6 %
EST. GFR  (AFRICAN AMERICAN): 37 ML/MIN/1.73 M^2
EST. GFR  (NON AFRICAN AMERICAN): 32 ML/MIN/1.73 M^2
GLUCOSE SERPL-MCNC: 117 MG/DL
HCT VFR BLD AUTO: 50.5 %
HGB BLD-MCNC: 16.3 G/DL
LYMPHOCYTES # BLD AUTO: 1.8 K/UL
LYMPHOCYTES NFR BLD: 19 %
MCH RBC QN AUTO: 29.1 PG
MCHC RBC AUTO-ENTMCNC: 32.3 G/DL
MCV RBC AUTO: 90 FL
MONOCYTES # BLD AUTO: 1 K/UL
MONOCYTES NFR BLD: 10.6 %
NEUTROPHILS # BLD AUTO: 6.2 K/UL
NEUTROPHILS NFR BLD: 66.1 %
PHOSPHATE SERPL-MCNC: 3.2 MG/DL
PLATELET # BLD AUTO: 196 K/UL
PMV BLD AUTO: 11.5 FL
POTASSIUM SERPL-SCNC: 4.4 MMOL/L
PROT UR-MCNC: 185 MG/DL
PROT/CREAT RATIO, UR: 1.35
RBC # BLD AUTO: 5.6 M/UL
SODIUM SERPL-SCNC: 142 MMOL/L
WBC # BLD AUTO: 9.42 K/UL

## 2018-01-19 PROCEDURE — 83520 IMMUNOASSAY QUANT NOS NONAB: CPT | Mod: 59

## 2018-01-19 PROCEDURE — 82570 ASSAY OF URINE CREATININE: CPT

## 2018-01-19 PROCEDURE — 36415 COLL VENOUS BLD VENIPUNCTURE: CPT

## 2018-01-19 PROCEDURE — 80069 RENAL FUNCTION PANEL: CPT

## 2018-01-19 PROCEDURE — 85025 COMPLETE CBC W/AUTO DIFF WBC: CPT

## 2018-01-22 LAB
KAPPA LC SER QL IA: 7.01 MG/DL
KAPPA LC/LAMBDA SER IA: 2.31
LAMBDA LC SER QL IA: 3.03 MG/DL

## 2018-01-24 ENCOUNTER — OFFICE VISIT (OUTPATIENT)
Dept: NEPHROLOGY | Facility: CLINIC | Age: 73
End: 2018-01-24
Payer: MEDICARE

## 2018-01-24 VITALS
DIASTOLIC BLOOD PRESSURE: 88 MMHG | WEIGHT: 204.56 LBS | HEART RATE: 80 BPM | SYSTOLIC BLOOD PRESSURE: 124 MMHG | HEIGHT: 72 IN | BODY MASS INDEX: 27.71 KG/M2 | RESPIRATION RATE: 17 BRPM

## 2018-01-24 DIAGNOSIS — E21.3 HYPERPARATHYROIDISM: ICD-10-CM

## 2018-01-24 DIAGNOSIS — N06.8 ISOLATED PROTEINURIA WITH OTHER MORPHOLOGIC LESION: ICD-10-CM

## 2018-01-24 DIAGNOSIS — N18.30 CKD (CHRONIC KIDNEY DISEASE), STAGE III: ICD-10-CM

## 2018-01-24 PROCEDURE — 99213 OFFICE O/P EST LOW 20 MIN: CPT | Mod: S$GLB,,, | Performed by: INTERNAL MEDICINE

## 2018-01-24 PROCEDURE — 99999 PR PBB SHADOW E&M-EST. PATIENT-LVL III: CPT | Mod: PBBFAC,,, | Performed by: INTERNAL MEDICINE

## 2018-01-24 NOTE — PROGRESS NOTES
Subjective:       Patient ID: Federico Knutson is a 72 y.o. White male who presents for new a follow up of CKD  Interval hx:  Patient feels well, no LUTS, hematuria,, dysuria, SOB, CP, peripheral edema, He is not using protonix, he has used some motrin fro hip pain but usually uses tylenol. He is still smoking.serum crt stable at 1.8-2.0 mg/dl, electrolytes stable.  He does not take his bp at home.  Kappa light chain seen only on [facility] 8/2017, free light chains ratio c/w ckd   He refuses flu and pneumovax       69 yo Gentleman with a hx of Melanoma of the forehead excised (lymph nodes) CVA, The patient has a history of obstructive uropathy and is seen by Dr. Ray. He underwent dye laser prostatectomy 6/9/15. Postvoid residual urine prior to prostatectomy was 2000 cc. Postvoid residual urine in June ago was 100 cc. Patient has compromised renal function at present. 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 Renal ultrasound was ago showed mild right hydronephrosis and subsequent retrograde pyelogram revealed no ureteral obstruction. The patient has no family history of kidney disease. The patient does not freuqently use NSAIDS or herbal supplements. He had a GI bleed last week and was found to have an ulcer. He also still has episodes of macroscopic hematuria after his prostatectomy. Also recently treated for a urinary tract infection. He still smokes a pack a day. He drinks a gallon of milk a day. Not on a low salt diet.   He has stable kidney fuction for the last year but still ongoing proteinuria.He complains about left hip pain and back pain which has been getting worse.   2013 ef 55    UPRCT now 2.2 increased from 1.0 a year ago serum crt 1.9 stable is on 5 mg of lisinopril. borderline potassiums of 5-5.1 in the past.  Hemoglobin is now 17.  He is still smoking 1 pack per day and refuses to stop.  He has not seen a urologist in some time.  He  will make an appointment with a new local urologist in the next couple of months  The patient has no symptoms of fatigue, shortness of breath, chest pain, peripheral edema, flank pain, dysuria, hematuria, foamy urine, orange colored urine, nephrolithiasis,  diarrhea, constipation, lower extremity leg cramping, headache, nausea and vomiting, or weakness.      HPI  Review of Systems   Constitutional: Negative.    HENT: Negative.    Eyes: Negative.    Respiratory: Positive for cough.    Cardiovascular: Negative.    Gastrointestinal: Negative.  Negative for diarrhea, nausea and vomiting.   Genitourinary: Negative for decreased urine volume, difficulty urinating, dysuria, hematuria and urgency.   Musculoskeletal: Negative.    Skin: Negative.    Neurological: Negative.    Psychiatric/Behavioral: Negative.        Objective:     Blood pressure 124/88, pulse 80, resp. rate 17, height 6' (1.829 m), weight 92.8 kg (204 lb 9.4 oz).    Physical Exam   Constitutional: He is oriented to person, place, and time. He appears well-developed and well-nourished.   HENT:   Head: Normocephalic and atraumatic.   Right Ear: External ear normal.   Left Ear: External ear normal.   Nose: Nose normal.   Mouth/Throat: Oropharynx is clear and moist.   Eyes: Conjunctivae and EOM are normal. Pupils are equal, round, and reactive to light.   Neck: Normal range of motion. Neck supple. No JVD present.   Cardiovascular: Normal rate, regular rhythm, normal heart sounds and intact distal pulses.    No murmur heard.  Trace bilateral ankle edema   Pulmonary/Chest: No stridor. No respiratory distress. He has wheezes. He exhibits no tenderness.   Diminished breath sounds throughout decreased effort with scattered rhonchi and wheezes throughout   Abdominal: Soft. Bowel sounds are normal. He exhibits no distension and no mass. There is no tenderness. There is no rebound and no guarding.   Musculoskeletal: Normal range of motion.   Lymphadenopathy:     He has  no cervical adenopathy.   Neurological: He is alert and oriented to person, place, and time. He has normal reflexes. No cranial nerve deficit. Coordination normal.   Skin: Skin is warm and dry.   2 scars on right forehead, 2 cm lesion on his back.   Psychiatric: He has a normal mood and affect. His behavior is normal. Judgment and thought content normal.   Nursing note and vitals reviewed.      Assessment:       1. Solitary right kidney    2. 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 Hyperparathyroidism        Plan:       1. CKD3: stable renal function  - long standing low proteinuria: multifactorial: solitary kidney (secodary to stone disease), multiple cysts in his remaining right kidney, post obstruction, likely has vascular disease.   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 Also the smoking could cause nodular sclerosis.  - already on a statin  - No discrete monoclonal peaks identified.  But kappa light chain on[facility]IFE free ligth chain ratio c/w ckd   will repeat 24 hr urine, would be difficult to biopsy single kidney w cysts and crt 2.0    He is not compliant with any kind of diet. I told him that his kidney function will decline slowly.  - at this time because of a single kidney and the patient does not want a biopsy    - started low dose ACE (would like to increase it but the patient was mildly hyperkalemic).  Continue present dose     -acquired cysts repeat US annually.    Elevated PSA   To set up an appointment with a new urologist   Last seen by cardiology 6 mo ago    Lab Results   Component Value Date    CREATININE 2.0 (H) 01/19/2018     ·   Acid-Base: stable  Lab Results   Component Value Date     01/19/2018    K 4.4 01/19/2018    CO2 27 01/19/2018     2. HTN: Blood pressures well controlled    3. Renal osteodystrophy: PTH and vitamin D stable reduce her ergocalciferol to 50,000  units once monthly  Lab Results   Component Value Date    PTH 77.0 05/04/2017    CALCIUM 10.1 01/19/2018    PHOS 3.2 01/19/2018       4. Anemia: not anemic, H/H relatively elevated considering renal disease - likely from smoking.  Lab Results   Component Value Date    HGB 16.3 01/19/2018        5. DM:  Last HbA1C   Lab Results   Component Value Date    HGBA1C 6.1 04/03/2013       6. Lipid management: on a statin.  Lab Results   Component Value Date    LDLCALC 56.0 (L) 04/03/2013     7. Elevated PSA: advised to see urology    8. Seen by dermatologist  amd told it was clear as per patient    9. He was educated again to quit smoking!!    Follow up in 3-4 month with labs every 3 months.

## 2018-01-24 NOTE — PATIENT INSTRUCTIONS
Labs and urine 4 mo with 24 hr tiemd urine     rtc 4 mo    Call and make appointment with new urologist to check your prostate

## 2018-02-20 ENCOUNTER — TELEPHONE (OUTPATIENT)
Dept: NEPHROLOGY | Facility: CLINIC | Age: 73
End: 2018-02-20

## 2018-02-20 NOTE — TELEPHONE ENCOUNTER
----- Message from Romana Gustafson MA sent at 2/20/2018  1:39 PM CST -----  Contact: Pt   [phone]  No ma'am!    ----- Message -----  From: Meenu Villanueva LPN  Sent: 2/20/2018   1:31 PM  To: Romana Gustafson MA    Did you call this pt?  ----- Message -----  From: Mar Olvera LPN  Sent: 2/20/2018   1:13 PM  To: Meenu Villanueva LPN    no  ----- Message -----  From: Meenu Villanueva LPN  Sent: 2/20/2018   1:11 PM  To: Mar Olvera LPN, Romana Gustafson MA    Who called this pt?  ----- Message -----  From: Gabbie Treviño  Sent: 2/20/2018  10:04 AM  To: Do ELIZON[facility] Staff    Pt returning the nurse phone call in regards to the Dr, reading pt test result and scheduling an appt , call back to verify